# Patient Record
Sex: MALE | Race: WHITE
[De-identification: names, ages, dates, MRNs, and addresses within clinical notes are randomized per-mention and may not be internally consistent; named-entity substitution may affect disease eponyms.]

---

## 2018-05-30 ENCOUNTER — HOSPITAL ENCOUNTER (EMERGENCY)
Dept: HOSPITAL 80 - FED | Age: 23
LOS: 1 days | Discharge: HOME | End: 2018-05-31
Payer: COMMERCIAL

## 2018-05-30 DIAGNOSIS — F17.210: ICD-10-CM
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
## 2018-05-30 DIAGNOSIS — S51.812A: Primary | ICD-10-CM

## 2018-05-30 DIAGNOSIS — F32.4: ICD-10-CM

## 2018-05-30 PROCEDURE — 0HQCXZZ REPAIR LEFT UPPER ARM SKIN, EXTERNAL APPROACH: ICD-10-PCS

## 2018-05-30 PROCEDURE — 0HQEXZZ REPAIR LEFT LOWER ARM SKIN, EXTERNAL APPROACH: ICD-10-PCS

## 2018-05-31 VITALS — DIASTOLIC BLOOD PRESSURE: 89 MMHG | SYSTOLIC BLOOD PRESSURE: 136 MMHG

## 2018-05-31 NOTE — EDPHY
General





- History


Smoking Status: Current every day smoker


Time Seen by Provider: 05/30/18 23:34


Narrative: 





CHIEF COMPLAINT: 


Arm laceration





HISTORY OF PRESENT ILLNESS: 


Patient presents with complaints of left arm laceration.  He says he was 

feeling depressed because he lost his job and has no friends.  He said he acted 

out but had no thoughts of wanting to die.  He immediately regretted it.  He 

says he has pain left arm for the lacerations.  No numbness, tingling or 

weakness.  He is very remorseful of this and has no thoughts of wanting to harm 

himself or others at this time.  He says that he does have depression and 

manages this with bupropion and does see a psychiatrist here in town, Dr. Roach.  He denies previous attempts of harm.  He denies any previous inpatient 

therapy.  His father presents with him as his roommate contacted the father 

after the patient cut his arm.  No other associated complaints or modifying 

factors.





Right-hand dominant





TIME OF INJURY:


Less than 3 hr ago





TETANUS STATUS:


Up-to-date





MEDICAL/SURGICAL/SOCIAL HISTORY:


Depression.  Smokes cigarettes.  Admits to alcohol use.  Currently unemployed





REVIEW OF SYSTEMS:


Ten systems reviewed and are negative unless otherwise noted in the HPI





EXAMINATION


General Appearance:  Alert, no distress.  Well-developed well-nourished.  

Tearful


Head: normocephalic, atraumatic.


ENT:  Pupils equal round reactive.  Airway patent.


Cardiovascular:  Regular rhythm.  No murmur.  Pulses symmetric in the radial 2+

.  Brisk cap refill left hand


Neurological:  A&O, sensory symmetric, interossei strength symmetric.   

strength symmetric.


Skin:  Warm and dry, no rash.  Multiple hesitation superficial laceration of 

the left anterior forearm.  There are 3 complex lacerations of the left 

anterior forearm.  The proximal (laceration #1) is approximately 6 cm in 

curvilinear.  This is partial thickness with exposure of the underlying 

subcutaneous tissue and fascia but no compromise of the flexor fascia or 

muscle.  The medial laceration (laceration #2) is approximately 4 cm and 

curvilinear.  There is exposure of the underlying subcutaneous tissue but no 

exposure of the fascia or flexor muscle.  The distal laceration (Laceration #3) 

is approximately 2.5 cm in curvilinear.  This does not involve subcutaneous 

tissue.  He is neurovascular intact distally with no foreign body.


Extremities:  Tenderness to the area of laceration left anterior forearm.  

There is full range of motion of the elbow, wrist and fingers including full 

flexion of the superficialis and profundus of each finger.  The hand range of 

motion is symmetric to the right.





DIFFERENTIAL DIAGNOSES:


Including but not limited to hesitation cuts, self-inflicted lacerations, 

depression, suicidal ideation








MDM:


11:30 p.m.


Intentional lacerations to the left forearm x3.  These are significant 

lacerations that will require suture repair.  He admits to feeling depressed 

but says that he is not suicidal and does not wish to harm himself.  He denies 

access to weapons other than the knife.  His father is at bedside with him.  I 

do not feel he warrants an M1 at this time, but I will consider this and 

discussed with Dr. Jensen





11:45 p.m.


I discussed with Dr. Jensen, and she will evaluate the patient.





12:40 a.m.


Complex lacerations have been closed as below.  These were copiously irrigated 

and closed without difficulty.  Full flexion extension retained after the 

repair.  Due to the depth the laceration, I will treat him prophylactically 

with Keflex.  I discussed wound care.  I discussed return here in 10 days for 

suture removal.  We had a very lengthy discussion regarding his depression, and 

he is willing to contract for safety.  His father is willing to take him home 

and take responsibility for the patient.  He is pending evaluation by Dr. Jensen.





12:55 a.m.


Patient has been evaluated by Dr. Jensen.  We are in agreement that the 

patient is safe for discharge home.  I do not feel he has injured himself, and 

he continues to deny suicidal ideation.  The father has agreed to take the 

patient home stay with [facility].  They will contact the established 

psychiatrist Dr. Roach tomorrow morning for outpatient care.  They will return 

here immediately if he has any thoughts of self-harm.  There are no weapons in 

the home.  The father will assume responsibility the patient is discharged home 

stable condition.





PROCEDURE: Laceration repair, #1


Consent:  Verbal


Location:  Left anterior forearm, proximal


Length of repair:  6 cm curvilinear


Complexity:  Complex


Layer involvement:  2 layer


Anesthesia:  Local.  1% Marcaine with epinephrine.  5 mL


Irrigation:  Extensive


Debridement:  None


Procedure description:  Following good anesthesia, the wound was copiously 

irrigated.  Wound bed was explored with a sterile glove, and there is no 

foreign body noted.  There is damage to the underlying subcutaneous tissue but 

not to the anterior fascia of the flexor apparatus.  No tendon injury.  Wound 

borders were approximated well with good hemostasis.  Tolerated well without 

complication.


Suture/Staple material:  Subcutaneous:  5-0 Vicryl, 8 running sutures.  

Cutaneous layer:  4-0 Prolene, 12 running sutures


Wound care:  Routine as discussed


Suture/Staple removal:  10 Days








PROCEDURE: Laceration repair, 2. 


Consent:  Verbal


Location:  Mid forearm


Length of repair:  4 cm


Complexity:  Complex


Layer involvement:  2 layer


Anesthesia:  Local.  1% Marcaine with epinephrine, 3 mL


Irrigation:  Extensive


Debridement:  None


Procedure description:  Following good anesthesia, the wound was copiously 

irrigated.  Wound bed was explored with a sterile glove, and there is no 

foreign body noted.  No underlying fascia, muscle or tendinous injury.  Wound 

borders were approximated well with good hemostasis.  Tolerated well without 

complication.


Suture/Staple material:  Subcutaneous:  5-0 Vicryl, 6 running sutures.  

Cutaneous layer:  4-0 Prolene, 10 simple interrupted sutures


Wound care:  Routine as discussed


Suture/Staple removal: 10 Days








PROCEDURE: Laceration repair, 3. 


Consent:  Verbal


Location:  Distal forearm


Length of repair:  2 cm


Complexity:  Simple


Layer involvement:  Single


Anesthesia:  Local.  1% Marcaine with epinephrine, 2 mL


Irrigation:  Extensive


Debridement:  None


Procedure description:  Following good anesthesia, the wound was copiously 

irrigated.  Wound bed was explored with a sterile glove, and there is no 

foreign body noted.  No subcutaneous, fascia or muscle belly injury.  Wound 

borders were approximated well with good hemostasis.  Tolerated well without 

complication.


Suture/Staple material:  4-0 Prolene, 6 simple ruptured sutures


Wound care:  Routine as discussed


Suture/Staple removal: 10  Days








SUPERVISION:


Patient was evaluated and examined in conjunction with my secondary supervising 

physician as documented. We have both examined the patient.








 (Kyler Nick)





I met with the patient and his father in the emergency department.  The patient 

has been suffering from some social stressors lately, losing his job and moving 

in with his parents.  He is quite unhappy about this because he had what he 

describes as a dream job.  He got in an argument with his roommate and told his 

roommate he would cut himself and then went into his room and did.  He was not 

trying to commit suicide he says rather wanted to hurt himself in some way but 

not end his life.  He is able to go home with his father who will watch over 

him.  He does have a treating psychiatrist whom he can follow up with.  He is 

able to return to the emergency department if he is feeling worse and contracts 

for safety.  I feel he does not meet criteria for an M1 hold and we will plan 

on discharging him from the emergency department. (Lillian Jensen)





- Objective


Vital Signs: 





 Initial Vital Signs











Temperature (C)  36.6 C   05/30/18 22:42


 


Heart Rate  102 H  05/30/18 22:42


 


Respiratory Rate  18   05/30/18 22:42


 


Blood Pressure  147/102 H  05/30/18 22:42


 


O2 Sat (%)  98   05/30/18 22:42








 











O2 Delivery Mode               Room Air














Allergies/Adverse Reactions: 


 





No Known Allergies Allergy (Verified 05/30/18 22:45)


 








Home Medications: 














 Medication  Instructions  Recorded


 


buPROPion  01/31/16


 


Cephalexin [Keflex (*)] 500 mg PO QID #24 cap 05/31/18











Medications Given: 





 








Discontinued Medications





Cephalexin (Keflex 500 Mg Prepack#4)  1 btl TAKEHOME EDNOW ONE


   PRN Reason: Protocol


   Stop: 05/31/18 00:44


   Last Admin: 05/31/18 01:00 Dose:  1 btl


Ibuprofen (Motrin)  600 mg PO EDNOW ONE


   Stop: 05/31/18 00:44


   Last Admin: 05/31/18 01:00 Dose:  600 mg








Departure





- Departure


Disposition: Home, Routine, Self-Care


Clinical Impression: 


Laceration of forearm, complicated


Qualifiers:


 Encounter type: initial encounter Laterality: left Qualified Code(s): S51.812A 

- Laceration without foreign body of left forearm, initial encounter





Forearm laceration


Qualifiers:


 Encounter type: initial encounter Laterality: left Qualified Code(s): S51.812A 

- Laceration without foreign body of left forearm, initial encounter





Laceration of forearm without complication


Qualifiers:


 Encounter type: initial encounter Laterality: left Qualified Code(s): S51.812A 

- Laceration without foreign body of left forearm, initial encounter





Depression


Qualifiers:


 Depression Type: major depressive disorder Major depression recurrence: single 

episode Active/Remission status: in partial remission Qualified Code(s): F32.4 

- Major depressive disorder, single episode, in partial remission





Condition: Good


Instructions:  Cephalexin (By mouth), Care For Your Stitches (ED), Laceration (

ED), Depression (ED)


Additional Instructions: 


1. Keflex by mouth 4 times daily for 7 days


2. Ibuprofen 600 mg every 6-8 hours as needed


3. Daily wound care as discussed and provided


4. Follow up with psychiatrist and primary care physician


5. You will need to return to the emergency department in 10 days for suture 

removal


6. ED precautions as discussed for signs of infection or any thoughts of self-

harm or harm towards others 


Referrals: 


NONE *PRIMARY CARE P,. [Primary Care Provider] - As per Instructions


Physician,Behavioral Health, MD [Medical Doctor] - As per Instructions


Prescriptions: 


Cephalexin [Keflex (*)] 500 mg PO QID #24 cap